# Patient Record
Sex: MALE | Race: WHITE | NOT HISPANIC OR LATINO | Employment: FULL TIME | URBAN - METROPOLITAN AREA
[De-identification: names, ages, dates, MRNs, and addresses within clinical notes are randomized per-mention and may not be internally consistent; named-entity substitution may affect disease eponyms.]

---

## 2024-01-11 ENCOUNTER — OFFICE VISIT (OUTPATIENT)
Dept: FAMILY MEDICINE | Facility: CLINIC | Age: 24
End: 2024-01-11
Payer: COMMERCIAL

## 2024-01-11 VITALS
RESPIRATION RATE: 18 BRPM | WEIGHT: 295 LBS | HEART RATE: 85 BPM | TEMPERATURE: 98.8 F | SYSTOLIC BLOOD PRESSURE: 153 MMHG | OXYGEN SATURATION: 97 % | DIASTOLIC BLOOD PRESSURE: 81 MMHG

## 2024-01-11 DIAGNOSIS — R31.0 GROSS HEMATURIA: Primary | ICD-10-CM

## 2024-01-11 DIAGNOSIS — R30.0 DYSURIA: ICD-10-CM

## 2024-01-11 LAB
ALBUMIN UR-MCNC: NEGATIVE MG/DL
APPEARANCE UR: CLEAR
BACTERIA #/AREA URNS HPF: ABNORMAL /HPF
BILIRUB UR QL STRIP: NEGATIVE
COLOR UR AUTO: YELLOW
GLUCOSE UR STRIP-MCNC: NEGATIVE MG/DL
HGB UR QL STRIP: ABNORMAL
KETONES UR STRIP-MCNC: ABNORMAL MG/DL
LEUKOCYTE ESTERASE UR QL STRIP: NEGATIVE
NITRATE UR QL: NEGATIVE
PH UR STRIP: 5 [PH] (ref 5–8)
RBC #/AREA URNS AUTO: ABNORMAL /HPF
SP GR UR STRIP: >=1.03 (ref 1–1.03)
SQUAMOUS #/AREA URNS AUTO: ABNORMAL /LPF
UROBILINOGEN UR STRIP-ACNC: 0.2 E.U./DL
WBC #/AREA URNS AUTO: ABNORMAL /HPF

## 2024-01-11 PROCEDURE — 81001 URINALYSIS AUTO W/SCOPE: CPT | Performed by: NURSE PRACTITIONER

## 2024-01-11 PROCEDURE — 99203 OFFICE O/P NEW LOW 30 MIN: CPT | Performed by: NURSE PRACTITIONER

## 2024-01-11 ASSESSMENT — ENCOUNTER SYMPTOMS: FEVER: 0

## 2024-01-11 NOTE — PROGRESS NOTES
"Assessment & Plan     Dysuria    - UA Macroscopic with reflex to Microscopic and Culture - Clinic Collect  - UA Microscopic with Reflex to Culture    Gross hematuria       Patient with history of dysuria, gross hematuria seems to have resolved the last 2 times he voided with flank pain preceding this by 1 to 2 days with episodes of vomiting.  No family or personal history of kidney stones that he is aware of.    UA shows 2-5 RBCs.  No sign of infection.    He will having a bit of irritative voiding symptoms at the end of urinating.  With the left flank pain that was worse earlier and irritative voiding symptoms, patient aware and was informed that he could still have a retained kidney stone causing the symptoms.  Given offer of CT scan to check for kidney stone.  Patient would prefer to wait 1-2 more days to see if symptoms resolve on their own.    Recommend nonurgent follow-up for repeat UA in 1 to 2 weeks if feeling much better and back to normal.  Given follow-up information for PCP.    Return precautions discussed such as worsening flank pain, fever, chills, pain that does not resolve in 1 to 2 days.               Return in about 2 days (around 1/13/2024) for If no better.    [place], [facility] TONY Lux is a 23 year old male who presents to clinic today for the following health issues:  Chief Complaint   Patient presents with    Urinary Problem     Started last night, painful urination, blood in urine, the other day experienced vomiting and lower back pain but has gone away.      HPI    Blood in urine after urinating last night.  Yesterday and day before had some flank pain on the left and vomiting.  Does do heavy lifting at work.      Was advised to drink a lot of water and then had bleeding x 4, but last 2 times urinating, no blood seen.      Painful toward the end of urinating and then hurts for 45 minutes after.     \"Uncomfortable.\"  Overall pain level " is 2 out of 10.  Was worse earlier today.    No new sexual partners.      No h/o UTIs.              Review of Systems   Constitutional:  Negative for fever.               Objective    BP (!) 153/81 (BP Location: Right arm, Patient Position: Sitting, Cuff Size: Adult Large)   Pulse 85   Temp 98.8  F (37.1  C) (Oral)   Resp 18   Wt 133.8 kg (295 lb)   SpO2 97%   Physical Exam  Constitutional:       Appearance: Normal appearance.   Pulmonary:      Effort: Pulmonary effort is normal.   Abdominal:      Tenderness: There is left CVA tenderness (mild).   Skin:     General: Skin is warm.   Neurological:      Mental Status: He is alert and oriented to person, place, and time.   Psychiatric:         Mood and Affect: Mood normal.            Results for orders placed or performed in visit on 01/11/24 (from the past 24 hour(s))   UA Macroscopic with reflex to Microscopic and Culture - Clinic Collect    Specimen: Urine, Clean Catch   Result Value Ref Range    Color Urine Yellow Colorless, Straw, Light Yellow, Yellow    Appearance Urine Clear Clear    Glucose Urine Negative Negative mg/dL    Bilirubin Urine Negative Negative    Ketones Urine Trace (A) Negative mg/dL    Specific Gravity Urine >=1.030 1.005 - 1.030    Blood Urine Small (A) Negative    pH Urine 5.0 5.0 - 8.0    Protein Albumin Urine Negative Negative mg/dL    Urobilinogen Urine 0.2 0.2, 1.0 E.U./dL    Nitrite Urine Negative Negative    Leukocyte Esterase Urine Negative Negative   UA Microscopic with Reflex to Culture   Result Value Ref Range    Bacteria Urine Few (A) None Seen /HPF    RBC Urine 2-5 (A) 0-2 /HPF /HPF    WBC Urine 0-5 0-5 /HPF /HPF    Squamous Epithelials Urine Few (A) None Seen /LPF    Narrative    Urine Culture not indicated

## 2024-01-11 NOTE — PATIENT INSTRUCTIONS
If you start to feel better over the next day or 2, call the phone number in the upper right corner to be rechecked related to the blood in your urine and establish care with a primary care provider.    Today, you have a small amount of blood in your urine.  No sign of infection.  You could still actively have a kidney stone, for instance.  If you still have pain in the next 1 to 2 days, recommend coming back.  We are open 9-8 on the weekend.    I am here tomorrow if you change your mind and want to be rechecked.  If you have more such as urinary retention, fevers, chills, or worsening back pain, come back immediately.    See handout with more information.

## 2024-11-21 ENCOUNTER — OFFICE VISIT (OUTPATIENT)
Dept: URGENT CARE | Facility: URGENT CARE | Age: 24
End: 2024-11-21
Payer: COMMERCIAL

## 2024-11-21 VITALS
HEART RATE: 92 BPM | DIASTOLIC BLOOD PRESSURE: 99 MMHG | SYSTOLIC BLOOD PRESSURE: 147 MMHG | OXYGEN SATURATION: 97 % | RESPIRATION RATE: 18 BRPM | WEIGHT: 291.3 LBS | TEMPERATURE: 98.4 F

## 2024-11-21 DIAGNOSIS — K21.9 GASTROESOPHAGEAL REFLUX DISEASE WITHOUT ESOPHAGITIS: Primary | ICD-10-CM

## 2024-11-21 DIAGNOSIS — R07.9 CHEST PAIN, UNSPECIFIED TYPE: ICD-10-CM

## 2024-11-21 DIAGNOSIS — A08.4 VIRAL GASTROENTERITIS: ICD-10-CM

## 2024-11-21 LAB
ATRIAL RATE - MUSE: 79 BPM
DIASTOLIC BLOOD PRESSURE - MUSE: NORMAL MMHG
INTERPRETATION ECG - MUSE: NORMAL
P AXIS - MUSE: 39 DEGREES
PR INTERVAL - MUSE: 154 MS
QRS DURATION - MUSE: 92 MS
QT - MUSE: 380 MS
QTC - MUSE: 435 MS
R AXIS - MUSE: 28 DEGREES
SYSTOLIC BLOOD PRESSURE - MUSE: NORMAL MMHG
T AXIS - MUSE: 5 DEGREES
VENTRICULAR RATE- MUSE: 79 BPM

## 2024-11-21 RX ORDER — NAPROXEN 500 MG/1
500 TABLET ORAL 2 TIMES DAILY WITH MEALS
Qty: 20 TABLET | Refills: 0 | Status: SHIPPED | OUTPATIENT
Start: 2024-11-21 | End: 2024-12-01

## 2024-11-21 RX ORDER — ONDANSETRON 4 MG/1
4 TABLET, ORALLY DISINTEGRATING ORAL EVERY 8 HOURS PRN
Qty: 30 TABLET | Refills: 0 | Status: SHIPPED | OUTPATIENT
Start: 2024-11-21

## 2024-11-22 NOTE — PROGRESS NOTES
ASSESSMENT & PLAN:   Diagnoses and all orders for this visit:  Gastroesophageal reflux disease without esophagitis  -     omeprazole (PRILOSEC) 20 MG DR capsule; Take 1 capsule (20 mg) by mouth daily.  -     PRIMARY CARE FOLLOW-UP SCHEDULING; Future  Viral gastroenteritis  -     ondansetron (ZOFRAN ODT) 4 MG ODT tab; Take 1 tablet (4 mg) by mouth every 8 hours as needed for nausea.  Chest pain, unspecified type  -     XR Chest 2 Views; Future  -     EKG 12-lead, tracing only  -     naproxen (NAPROSYN) 500 MG tablet; Take 1 tablet (500 mg) by mouth 2 times daily (with meals) for 10 days.  -     PRIMARY CARE FOLLOW-UP SCHEDULING; Future    Chest pain intermittent x 3 months, more persistent x 1 week. EKG NSR with no acute changes. Chest XR normal. Pain is reproducible with movement and with palpation over left upper chest - likely muscular. Although I suspect reflux may be contributing as well. Recommend Naproxen x 10 days for chest pain. Start Omeprazole daily for reflux. Handout given for reflux lifestyle/dietary changes. Also has had symptoms of gastroenteritis x 2 days which has been improving. Likely viral etiology. Supportive treatment with BRAT diet, fluids, Zofran as needed. Referral placed to family medicine to establish care/follow-up if persistent symptoms.     At the end of the encounter, I discussed results, diagnosis, medications. Discussed red flags for immediate return to clinic/ER, as well as indications for follow up if no improvement. Patient and/or caregiver understood and agreed to plan. Patient was stable for discharge.    Patient Instructions   Your EKG looks good.  Chest xray looks good.    Try naproxen x 10 days for your chest pain, stretching, heat/ice.   Start omeprazole daily for reflux. Refer to attached handout for other ways to reduce reflux.    This vomiting/diarrhea is most likely viral. Viral gastroenteritis can last up to 10-14 days, but typically the first 72 hours are the most  severe.   Drink small but frequent sips of clear fluids such as water, Pedialyte, or G2 sports drink. I recommend G2 over original Gatorade because it has a lower sugar content.   Limit dairy products for 5-7 days.  Continue with a bland foods. BRAT diet is recommended which stands for: bananas, rice, applesauce, toast.   Avoid spicy or greasy foods.        Go to ER if having severe chest pain, shortness of breath, dizziness, signs of dehydration or unable to keep fluids down.     Return in about 2 weeks (around 12/5/2024) for follow-up with PCP if symptoms persist.    40 minutes spent with the patient doing chart review, review of outside records, review of test results, interpretation of tests, patient visit and documentation.     ------------------------------------------------------------------------  SUBJECTIVE  History was obtained from patient.    Patient presents with:  Chest Pain: 1 week Chest pain and heart palpation.  Vomiting: Vomiting,stomach pain and diarrhea.    HPI  Jose J Harrison is a(n) 24 year old male presenting to urgent care for 2 issues.    Chest pain. Intermittent x 3 months, only occurring when laying down and improved when sitting up. Now has been more persistent x 1 week. Describes dull pain that waxes and wanes. Located bilateral pectoral area, sometimes substernal - moves around. Worse with deep breath, stretching/moving either shoulder. Improved with putting pressure on chest with hand. Has some phlegm in his throat. No fever, congestion, rhinorrhea, sore throat, cough, shortness of breath. No worsening with activity. Wondering if it is a muscular issue. He lifts and moves appliances for work. No specific injury. No history blood blot. No recent hospitalization or surgery.   Vomiting and diarrhea x 2 days. Diarrhea has resolved, none today. Vomiting is improved, had 1 episode today. Reports history of acid reflux, does not take medication. Reports reflux keeps him up at night 1-2 times  per week. No hematochezia, hematemesis, melena. Recent URI exposures. Recent diet changes - eating more chicken, sha bread, protein shakes/bars.     Review of Systems    Current Outpatient Medications   Medication Sig Dispense Refill    naproxen (NAPROSYN) 500 MG tablet Take 1 tablet (500 mg) by mouth 2 times daily (with meals) for 10 days. 20 tablet 0    omeprazole (PRILOSEC) 20 MG DR capsule Take 1 capsule (20 mg) by mouth daily. 30 capsule 0    ondansetron (ZOFRAN ODT) 4 MG ODT tab Take 1 tablet (4 mg) by mouth every 8 hours as needed for nausea. 30 tablet 0     Problem List:  There are no relevant problems documented for this patient.    No Known Allergies      OBJECTIVE  Vitals:    11/21/24 1743   BP: (!) 147/99   Pulse: 92   Resp: 18   Temp: 98.4  F (36.9  C)   SpO2: 97%   Weight: 132.1 kg (291 lb 4.8 oz)     Physical Exam   GENERAL: healthy, alert, no acute distress.   PSYCH: mentation appears normal. Normal affect  HEAD: normocephalic, atraumatic.  EYE: PERRL. EOMs intact. No scleral injection bilaterally.   OROPHARYNX: moist mucous membranes.   LUNGS: no increased work of breathing. Clear lung sounds bilaterally. No wheezing, rhonchi, or rales.   CV: regular rate and rhythm. No clicks, murmurs, or rubs.  ABDOMEN: soft, nondistended. Mild epigastric and RUQ pain. No guarding or rebound tenderness. Negative McBur[place]. Negative [place]. No CVA tenderness bilaterally.  MSK: no tenderness of cervical, thoracic, lumbar spinous process, bilateral iliac crest or SI joints. No tenderness of bilateral trapezius or shoulders. Chest wall with mild reproducible tenderness over left medial upper ribs.      Xrays were preliminarily reviewed by me - negative.          EKG Interpretation:      Interpreted by Celina Bueno PA-C  Time reviewed: 1855   Symptoms at time of EKG: None   Rhythm: Normal sinus   Rate: Normal  Axis: Normal  Ectopy: None  Conduction: Normal  ST Segments/ T Waves: No ST-T wave changes and No acute  ischemic changes  Q Waves: None  Comparison to prior: No old EKG available    Clinical Impression: normal EKG      Results for orders placed or performed during the hospital encounter of 11/21/24   XR Chest 2 Views     Status: None    Narrative    EXAM: XR CHEST 2 VIEWS  LOCATION: [facility]  DATE: 11/21/2024    INDICATION:  Chest pain, unspecified type  COMPARISON: None.      Impression    IMPRESSION: Negative chest.   Results for orders placed or performed in visit on 11/21/24   EKG 12-lead, tracing only     Status: None (Preliminary result)   Result Value Ref Range    Systolic Blood Pressure  mmHg    Diastolic Blood Pressure  mmHg    Ventricular Rate 79 BPM    Atrial Rate 79 BPM    IA Interval 154 ms    QRS Duration 92 ms     ms    QTc 435 ms    P Axis 39 degrees    R AXIS 28 degrees    T Axis 5 degrees    Interpretation ECG       Sinus rhythm  Normal ECG  No previous ECGs available

## 2024-11-22 NOTE — PATIENT INSTRUCTIONS
Your EKG looks good.  Chest xray looks good.    Try naproxen x 10 days for your chest pain, stretching, heat/ice.   Start omeprazole daily for reflux. Refer to attached handout for other ways to reduce reflux.    This vomiting/diarrhea is most likely viral. Viral gastroenteritis can last up to 10-14 days, but typically the first 72 hours are the most severe.   Drink small but frequent sips of clear fluids such as water, Pedialyte, or G2 sports drink. I recommend G2 over original Gatorade because it has a lower sugar content.   Limit dairy products for 5-7 days.  Continue with a bland foods. BRAT diet is recommended which stands for: bananas, rice, applesauce, toast.   Avoid spicy or greasy foods.        Go to ER if having severe chest pain, shortness of breath, dizziness, signs of dehydration or unable to keep fluids down.